# Patient Record
Sex: FEMALE | Race: WHITE | NOT HISPANIC OR LATINO | URBAN - METROPOLITAN AREA
[De-identification: names, ages, dates, MRNs, and addresses within clinical notes are randomized per-mention and may not be internally consistent; named-entity substitution may affect disease eponyms.]

---

## 2017-09-21 ENCOUNTER — OFFICE VISIT - HEALTHEAST (OUTPATIENT)
Dept: MIDWIFE SERVICES | Facility: CLINIC | Age: 32
End: 2017-09-21

## 2017-09-21 DIAGNOSIS — L29.2 VULVAR ITCHING: ICD-10-CM

## 2017-09-21 ASSESSMENT — MIFFLIN-ST. JEOR: SCORE: 1614.71

## 2017-12-12 ENCOUNTER — OFFICE VISIT - HEALTHEAST (OUTPATIENT)
Dept: FAMILY MEDICINE | Facility: CLINIC | Age: 32
End: 2017-12-12

## 2017-12-12 DIAGNOSIS — H66.91 ACUTE RIGHT OTITIS MEDIA: ICD-10-CM

## 2018-04-02 ENCOUNTER — OFFICE VISIT - HEALTHEAST (OUTPATIENT)
Dept: FAMILY MEDICINE | Facility: CLINIC | Age: 33
End: 2018-04-02

## 2018-04-02 DIAGNOSIS — R21 RASH: ICD-10-CM

## 2018-06-15 ENCOUNTER — OFFICE VISIT - HEALTHEAST (OUTPATIENT)
Dept: FAMILY MEDICINE | Facility: CLINIC | Age: 33
End: 2018-06-15

## 2018-06-15 DIAGNOSIS — R07.0 THROAT PAIN: ICD-10-CM

## 2018-06-15 LAB — DEPRECATED S PYO AG THROAT QL EIA: NORMAL

## 2018-06-16 LAB — GROUP A STREP BY PCR: NORMAL

## 2019-06-30 ENCOUNTER — OFFICE VISIT - HEALTHEAST (OUTPATIENT)
Dept: FAMILY MEDICINE | Facility: CLINIC | Age: 34
End: 2019-06-30

## 2019-06-30 DIAGNOSIS — Z20.818 EXPOSURE TO STREP THROAT: ICD-10-CM

## 2019-06-30 DIAGNOSIS — J02.9 ACUTE PHARYNGITIS, UNSPECIFIED ETIOLOGY: ICD-10-CM

## 2019-06-30 LAB — DEPRECATED S PYO AG THROAT QL EIA: NORMAL

## 2019-07-01 LAB — GROUP A STREP BY PCR: NORMAL

## 2019-09-12 ENCOUNTER — COMMUNICATION - HEALTHEAST (OUTPATIENT)
Dept: TELEHEALTH | Facility: CLINIC | Age: 34
End: 2019-09-12

## 2019-10-01 ENCOUNTER — COMMUNICATION - HEALTHEAST (OUTPATIENT)
Dept: HEALTH INFORMATION MANAGEMENT | Facility: CLINIC | Age: 34
End: 2019-10-01

## 2020-02-02 ENCOUNTER — OFFICE VISIT - HEALTHEAST (OUTPATIENT)
Dept: FAMILY MEDICINE | Facility: CLINIC | Age: 35
End: 2020-02-02

## 2020-03-10 ENCOUNTER — RECORDS - HEALTHEAST (OUTPATIENT)
Dept: VASCULAR ULTRASOUND | Facility: CLINIC | Age: 35
End: 2020-03-10

## 2020-03-10 ENCOUNTER — OFFICE VISIT - HEALTHEAST (OUTPATIENT)
Dept: VASCULAR SURGERY | Facility: CLINIC | Age: 35
End: 2020-03-10

## 2020-03-10 DIAGNOSIS — I83.893 VARICOSE VEINS OF BILATERAL LOWER EXTREMITIES WITH OTHER COMPLICATIONS: ICD-10-CM

## 2020-03-10 DIAGNOSIS — I83.893 SYMPTOMATIC VARICOSE VEINS OF BOTH LOWER EXTREMITIES: ICD-10-CM

## 2020-11-20 ENCOUNTER — OFFICE VISIT - HEALTHEAST (OUTPATIENT)
Dept: TELEHEALTH | Facility: CLINIC | Age: 35
End: 2020-11-20

## 2021-05-30 NOTE — PROGRESS NOTES
ASSESSMENT/PLAN:   1. Exposure to strep throat  Rapid Strep A Screen-Throat    azithromycin (ZITHROMAX Z-CAITLIN) 250 MG tablet    Group A Strep, RNA Direct Detection, Throat   2. Acute pharyngitis, unspecified etiology  azithromycin (ZITHROMAX Z-CAITLIN) 250 MG tablet     Patient appears well and is tolerating oral intake. No signs of peritonsillar or retropharyngeal abscess on exam. Clear lungs. Strep test negative however given history, exam and recent exposure, prescription for azithromycin sent to pharmacy.  Symptomatic cares advised.    At the end of the encounter, I discussed results, diagnosis, medications. Discussed red flags for immediate return to clinic/ER, as well as indications for follow up if no improvement. Please view below patient instructions for patient education and return precautions as were discussed during visit.  Patient understood and agreed to plan. Patient was stable for discharge.      Patient Instructions:  Patient Instructions   We will treat you with a course of antibiotics. Please complete the full course of antibiotics. Please take with food. Take a probiotic or eat a Greek yogurt daily while you are on the antibiotic. You will be contagious until you have completed 24 hours of the medication.  Please discard your toothbrush and replace with a new toothbrush to avoid reinfection.    Please use Tylenol 650mg every 4 hours or ibuprofen 600mg every 6 hours by mouth for pain/fever.  Do not exceed 4000mg of acetaminophen or 2400mg of ibuprofen from any source in a 24 hour period.  Taking Tylenol and ibuprofen together may be helpful in reducing pain.      May use salt water gargles and hot teas for comfort. Dissolve one teaspoon of salt in one cup of warm water to make a salt water gargle.    Watch for resolution of symptoms in the next few days. If you continue to have fevers, begin to have difficulty swallowing or breathing, notice neck pain or difficulty moving your neck, please return to  clinic or present to the ER immediately.  Otherwise, follow up with your PCP as needed.        SUBJECTIVE:   Angelina Coombs is a 34 y.o. female  who presents today for evaluation of sore throat and runny nose for a few days. No cough, does endorse odynophagia.  No fevers, no body aches or chills.  Both children currently being treated for strep. No meds for symptoms.     Past Medical History:  Patient Active Problem List   Diagnosis     S/P  section       Surgical History:    Reviewed; Non-contributory    Family History:  Family History   Problem Relation Age of Onset     Hypertension Mother      Arthritis Father      Asthma Sister      No Medical Problems Daughter      No Medical Problems Son      Heart disease Maternal Grandmother      Cancer Maternal Grandfather      Dementia Paternal Grandmother      Diabetes Paternal Grandfather      Heart disease Paternal Grandfather      No Medical Problems Sister      No Medical Problems Sister        Reviewed; Non-contributory      Social History:    Social History     Tobacco Use   Smoking Status Never Smoker   Smokeless Tobacco Never Used     Smoking:  Alcohol use:  Other drug use:  Occupation:      Smoke exposure:  :  Living situation:    Current Medications:  Current Outpatient Medications on File Prior to Visit   Medication Sig Dispense Refill     LACTOBACILLUS ACIDOPHILUS (PROBIOTIC ORAL) Take by mouth.       multivitamin therapeutic tablet Take 1 tablet by mouth daily.       No current facility-administered medications on file prior to visit.        Allergies:   Allergies   Allergen Reactions     Penicillin G Hives and Shortness Of Breath     Ciprofloxacin        I personally reviewed patient's past medical, surgical, social, family history and allergies.    ROS:  Comprehensive 12 pt ROS completed, positives noted in HPI, otherwise negative.      OBJECTIVE:   /75   Pulse 75   Temp 98  F (36.7  C) (Oral)   Resp 18   Wt 198 lb 12.8 oz  (90.2 kg)   SpO2 97%   BMI 30.91 kg/m        General Appearance:  Alert, well-appearing female in NAD. Afebrile.    Integument: Warm, dry  HEENT:  Head: Atraumatic, normocephalic. Face nontraumatic.  Eyes: Conjunctiva clear, Lids normal.  Ears:  TMs pearly, translucent bilaterally. No canal erythema or edema. No mastoid tenderness. No pain with palpation over tragus.  Nose: nares patent. No erythema of nasal mucosa. No rhinorrhea.  Oropharynx: No trismus. Moderate posterior pharyngeal erythema. No palatal petechiae. 2+ tonsillar hypertrophy, copious exudate. Uvula midline. Moist mucus membranes.  Neck: Supple, anterior cervical lymphadenopathy. No meningismus.  Respiratory: No distress. Lungs clear to ausculation bilaterally. No crackles, wheezes, rhonchi or stridor.  Cardiovascular: Regular rate and rhythm, no murmur, rub or gallop. No obvious chest wall deformities. Peripheral pulses 2+ bilaterally. No peripheral edema.  GI: Soft, nontender, normal bowel sounds. No masses, organomegaly, rigidity, or guarding.           Radiology:  I personally ordered and viewed this study. I agree with below radiology findings.    No results found.      Laboratory Studies:  I personally ordered and interpreted these studies.    Results for orders placed or performed in visit on 06/30/19   Rapid Strep A Screen-Throat   Result Value Ref Range    Rapid Strep A Antigen No Group A Strep detected, presumptive negative No Group A Strep detected, presumptive negative         Nicolette Pascual, CNP

## 2021-05-30 NOTE — PATIENT INSTRUCTIONS - HE
We will treat you with a course of antibiotics. Please complete the full course of antibiotics. Please take with food. Take a probiotic or eat a Greek yogurt daily while you are on the antibiotic. You will be contagious until you have completed 24 hours of the medication.  Please discard your toothbrush and replace with a new toothbrush to avoid reinfection.    Please use Tylenol 650mg every 4 hours or ibuprofen 600mg every 6 hours by mouth for pain/fever.  Do not exceed 4000mg of acetaminophen or 2400mg of ibuprofen from any source in a 24 hour period.  Taking Tylenol and ibuprofen together may be helpful in reducing pain.      May use salt water gargles and hot teas for comfort. Dissolve one teaspoon of salt in one cup of warm water to make a salt water gargle.    Watch for resolution of symptoms in the next few days. If you continue to have fevers, begin to have difficulty swallowing or breathing, notice neck pain or difficulty moving your neck, please return to clinic or present to the ER immediately.  Otherwise, follow up with your PCP as needed.

## 2021-05-31 VITALS — BODY MASS INDEX: 31.08 KG/M2 | WEIGHT: 198 LBS | HEIGHT: 67 IN

## 2021-05-31 VITALS — WEIGHT: 198 LBS | BODY MASS INDEX: 30.78 KG/M2

## 2021-06-01 VITALS — BODY MASS INDEX: 31.48 KG/M2 | WEIGHT: 202.5 LBS

## 2021-06-01 VITALS — WEIGHT: 202 LBS | BODY MASS INDEX: 31.4 KG/M2

## 2021-06-03 VITALS — BODY MASS INDEX: 30.91 KG/M2 | WEIGHT: 198.8 LBS

## 2021-06-04 VITALS
SYSTOLIC BLOOD PRESSURE: 92 MMHG | WEIGHT: 200.7 LBS | BODY MASS INDEX: 31.2 KG/M2 | RESPIRATION RATE: 16 BRPM | DIASTOLIC BLOOD PRESSURE: 64 MMHG | HEART RATE: 64 BPM | TEMPERATURE: 98.1 F

## 2021-06-06 NOTE — PROGRESS NOTES
Consult, self referring.  Bilateral symptomatic varicose veins, left worse than right.  Pain 3/10 BLE.  Wearing knee high compressions.  US done and reviewed.  VISENZE.
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
## 2021-06-14 NOTE — PROGRESS NOTES
Chief Complaint   Patient presents with     poss ear infection     Both ears, but mainly the right side . admit of a sore throat, low grade fever, and congested, and cough, 4x day.        HPI:    Patient is here for 4 days of cough, runny nose, sore throat, with bilateral ear pain, associated with low grade fever. No chest pain, shortness of breath.    ROS: Pertinent ROS noted in HPI.     Allergies   Allergen Reactions     Penicillin G Hives and Shortness Of Breath       Patient Active Problem List   Diagnosis     S/P  section       Family History   Problem Relation Age of Onset     Hypertension Mother      Arthritis Father      Asthma Sister      No Medical Problems Daughter      No Medical Problems Son      Heart disease Maternal Grandmother      Cancer Maternal Grandfather      Dementia Paternal Grandmother      Diabetes Paternal Grandfather      Heart disease Paternal Grandfather      No Medical Problems Sister      No Medical Problems Sister        Social History     Social History     Marital status:      Spouse name: N/A     Number of children: N/A     Years of education: N/A     Occupational History     Not on file.     Social History Main Topics     Smoking status: Never Smoker     Smokeless tobacco: Never Used     Alcohol use Yes      Comment: socially     Drug use: No     Sexual activity: Yes     Partners: Male     Birth control/ protection: Condom     Other Topics Concern     Not on file     Social History Narrative         Objective:    Vitals:    17 1409   BP: 124/70   Pulse: 92   Resp: 18   Temp: 99.1  F (37.3  C)   SpO2: 97%       Gen:NAD  Oropharynx: normal throat, oral mucosa  Ears: R TM erythematous and bulging. L TM normal. Ear canals normal  Nose: clear rhinorrhea  Neck:NAD  CV:RRR, no M, R, G  Pulm: CTAB, normal effort        Acute right otitis media  -     azithromycin (ZITHROMAX Z-CAITLIN) 250 MG tablet; Take 2 tablets (500 mg) on  Day 1,  followed by 1 tablet (250 mg) once  daily on Days 2 through 5.      Supportive cares as directed.

## 2021-06-17 NOTE — PROGRESS NOTES
Subjective:   Angelina Coombs is a 33 y.o. female  Roomed by: carrie      Chief Complaint   Patient presents with     Rash     all over abdomin, started 3/29, white spots, little itchy   Says she was in front the mirror on 3/29 and first noticed some tiny red bumps on the front of her lower abdomen.  Patient says that she has one bump on both sides of her abdomen.  Says that the spots are not very itchy at all. Has been staying with a friend for 3 weeks.  Denies that any other household members have had this rash.  Says the only new thing she has is using some free and clear clothes soap.She is an  and wanted to know if she her rash looks like something contagious like shingles or chickenpox.   Review of Systems  Skin - see HPI  Allergies   Allergen Reactions     Penicillin G Hives and Shortness Of Breath       Current Outpatient Prescriptions:      LACTOBACILLUS ACIDOPHILUS (PROBIOTIC ORAL), Take by mouth., Disp: , Rfl:      multivitamin therapeutic tablet, Take 1 tablet by mouth daily., Disp: , Rfl:   Patient Active Problem List   Diagnosis     S/P  section     Medical History Reviewed  Objective:     Vitals:    18 1422   BP: 118/62   Patient Site: Left Arm   Patient Position: Sitting   Cuff Size: Adult Regular   Pulse: 81   Resp: 16   Temp: 98  F (36.7  C)   TempSrc: Oral   SpO2: 98%   Weight: 202 lb 8 oz (91.9 kg)   General-patient is in no apparent distress  Skin-abdomen-just below the umbilicus are about 5 pinpoint erythematous papules; on both sides of her abdomen is a single 1 mm in diameter white papule with erythematous base.  No excoriations or induration noted.      Assessment - Plan   Medical Decision Making -33-year-old woman presents with a four-day history of about 7 single papules on the front and sides of her abdomen.  None of them are very pruritic.  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  Discussed the patient that her rash was not can  consistent with chickenpox, shingles, bedbugs, eczema or insect bites.  Advised patient to follow-up with 1 of the primary care providers or to be seen by dermatology.     1. Rash  - Nursing communication    At the conclusion of the encounter, assessment and plan were discussed.   All questions were answered.   The patient or guardian acknowledged understanding and was involved in the decision making regarding the overall care plan.    Patient Instructions   1. Follow up with either one of the primary care provider or you can try   [facility]   [address] [place] [address] [id number], [place] [address]  2. If you have any questions, call the clinic number - it's answered 24/7

## 2021-06-18 NOTE — PROGRESS NOTES
Subjective:      Patient ID: Angelina Coombs is a 33 y.o. female.    Chief Complaint:    HPI Angelina Coombs is a 33 y.o. female who presents today complaining of fever ×1 day.  Patient has a known exposure to strep throat from her daughter. She has been taking Ibuprofen at home her last dose was greater than 12 hours ago.  She reports that she is also having a scratchy throat and some mild ear pain.  She denies any runny nose, wheezing, abdominal complaints, or rash.  When she was having a fever she also reported headache that has since resolved.  She is mainly here to rule out strep throat.      Social History   Substance Use Topics     Smoking status: Never Smoker     Smokeless tobacco: Never Used     Alcohol use Yes      Comment: socially       Review of Systems   Constitutional: Positive for chills, fatigue and fever.   HENT: Positive for ear pain and sore throat. Negative for rhinorrhea.    Respiratory: Positive for cough (mild). Negative for wheezing.    Gastrointestinal: Negative for abdominal pain, diarrhea, nausea and vomiting.   Skin: Negative for rash.   Neurological: Positive for headaches.       Objective:     /60  Pulse 90  Temp 97.7  F (36.5  C) (Oral)   Resp 14  Wt 202 lb (91.6 kg)  LMP 05/25/2018 (Exact Date)  SpO2 97%  Breastfeeding? No  BMI 31.4 kg/m2    Physical Exam   Constitutional: Vital signs are normal. She appears well-developed and well-nourished. No distress.   HENT:   Head: Normocephalic and atraumatic.   Right Ear: Tympanic membrane, external ear and ear canal normal.   Left Ear: Tympanic membrane, external ear and ear canal normal.   Mouth/Throat: Uvula is midline. Posterior oropharyngeal erythema present. No oropharyngeal exudate, posterior oropharyngeal edema or tonsillar abscesses.   Eyes: Conjunctivae are normal.   Cardiovascular: Normal rate, regular rhythm and normal heart sounds.  Exam reveals no gallop and no friction rub.    No murmur  heard.  Pulmonary/Chest: Effort normal and breath sounds normal. No respiratory distress. She has no wheezes. She has no rales.   Lymphadenopathy:     She has cervical adenopathy (mild).   Skin: She is not diaphoretic.   Psychiatric: She has a normal mood and affect. Her behavior is normal. Judgment and thought content normal.   Nursing note and vitals reviewed.      Labs:  Recent Results (from the past 24 hour(s))   Rapid Strep A Screen-Throat   Result Value Ref Range    Rapid Strep A Antigen No Group A Strep detected, presumptive negative No Group A Strep detected, presumptive negative     Clinical Decision Making:  Patient's physical exam is relatively benign today.  Her fever has resolved since yesterday.  RST was negative today.  Confirmatory strep test pending.  Recommend supportive cares and follow-up if symptoms persist.    Assessment:     Procedures    1. Throat pain  Rapid Strep A Screen-Throat    Group A Strep, RNA Direct Detection, Throat         Patient Instructions   1) Increase fluids and rest  2) Continue taking Tylenol/Ibuprofen for fever/pain relief as needed.  3) Salt water gargles and lozenges can be helpful for throat relief  4) You will only be notified of the confirmatory strep results if they are positive.   5) Follow up if symptoms persist for longer than 5 days.

## 2021-06-18 NOTE — PATIENT INSTRUCTIONS - HE
"Patient Instructions by Beto Crawford RN at 3/10/2020  8:20 AM     Author: Beto Crawford RN Service: -- Author Type: Registered Nurse    Filed: 3/16/2020  9:24 PM Encounter Date: 3/10/2020 Status: Addendum    : Sarwat Healy MD (Physician)    Related Notes: Original Note by Beto Crawford RN (Registered Nurse) filed at 3/10/2020 10:33 AM       We are prescribing some compression stockings for you. I have included different suppliers that should help you get measured and fitting to ensure proper fitting socks. You should wear this socks as much as you can. It is especially important to wear them with long periods of sitting/standing, long car rides or if you will be flying. Compression socks should get refilled every 4-6 months. They do not need to be worn at night while in bed.    If you do a lot of standing it is good to do calf raises to help keep the blood pumping. If you sit a lot at work it is good to get up periodically to walk around. Elevation of the foot of your bed 4-6\" helps the blood return back to where it is needed.        Varicose Veins      Varicose veins are swollen, enlarged veins most often found in the legs. They are usually blue or purple in color and may bulge, twist, and stand out under the skin.  Normally, veins return blood from the body to the heart. The leg veins have one-way valves that prevent blood from flowing backward in the vein. When the valves are weak or damaged, blood backs up in the veins. This may cause some of the veins to swell and bulge and become varicose veins.  Symptoms  Varicose veins may or may not cause symptoms. If symptoms do occur, they can include:    Legs that feel tired, achy, heavy, or itchy    Leg muscle cramps    Skin changes, such as discoloration, dryness, redness, or rash (in more severe cases, you may also have sores on the skin called venous leg ulcers)  Risk Factors  There are a number of factors that increase the risk for varicose veins. " These can include:    Being a woman    Being older    Sitting or standing for long periods    Being overweight    Being pregnant    Having a family history of varicose veins  Treatment begins with simple self-help measures (see below). If these dont help, there are many procedures that can be done to shrink or remove varicose veins. Your healthcare provider can tell you more about these options, if needed.  Home care    Support or compression stockings will likely be prescribed. If so, be sure to wear them as directed. They may help improve blood flow.    Exercising helps strengthen your leg muscles and improve blood flow. To get the most benefit, choose exercises such as walking, swimming, or cycling. Also try to exercise for at least 30 minutes on most days.    Raising (elevating) your legs lets gravity help blood flow back to the heart. Sit or lie with your feet above heart level a few times throughout the day, or as directed.    Avoid long periods of sitting or standing. Change positions often. Also, move your ankles, toes and knees often. This may also help improve blood flow.    If you are overweight, talk with your healthcare provider about setting up a weight-loss plan. Maintaining a healthy weight can help reduce the strain on your veins. It may also improve symptoms, such as swelling and aching.    If you have dryness and itching, ask your provider about special lotions that can be applied to the skin to help improve symptoms.  Follow-up care  Follow up with your healthcare provider, or as directed. If imaging tests were done, youll be told the results and if there are any new findings that affect your care.  When to seek medical advice  Call your healthcare provider right away if any of these occur:    Sudden, severe leg swelling, pain, or redness    Symptoms worsen, or they dont improve with self-care    Bleeding from any affected veins    Ulcers form on the legs, ankles, or feet    Fever of 100.4 F  (38 C) or higher, or as advised by your provider      Understanding Spider and Varicose Veins  Do you often hide your legs because of the way they look? You may have noticed tiny red or blue bursts (spider veins). Or maybe you have veins that bulge or look twisted (varicose veins). If so, there are treatments that can help  What are the symptoms?  Spider veins or varicose veins may never be a problem. But sometimes they can cause legs to ache or swell. Your legs may also feel heavy and tired, or like theyre burning. These symptoms may be more severe at the end of the day. Prolonged sitting or standing can also make your symptoms worse.  Who gets spider and varicose veins?  Anyone can get spider or varicose veins. But vein problems tend to be hereditary (run in families). Other factors that can affect veins include:    Pregnancy, hormones, and birth control pills    A job where you stand or sit a lot    Extra weight or lack of exercise    Age         Spider veins look like tiny webs on the ankles, legs, and upper thighs.       Ropy, dark blue, red, or flesh-colored varicose veins are most common on the thighs, calves, and feet.    What can be done?  Spider and varicose veins can affect the way you feel about yourself. Talk to your healthcare provider about your concerns. There are treatments that can ease symptoms and make your legs look better.  Your treatment choices  Treatment may include self-care, sclerotherapy (injecting veins with a chemical), surgery, or newer nonsurgical minimally invasive therapies. Spider veins and some varicose veins can be treated with sclerotherapy. Large varicose veins can often be treated with newer minimally invasive procedures and, in rare cases, surgery may be needed.     Please call [facility] to schedule an appointment. If you received a prescription please bring it with you to your appointment. You may call one of the locations below, although some  locations are limited to what they carry.    Office Locations  New Locations  [facility]  Home Medical Equipment  [address], [place] [address]  Orthotics and Prosthetics ([facility])  [address], [place] [address]  Phone [phone] /Fax [phone]        [facility]   [address]   Medical Equipment [address]/Orthotics and Prosthetics [address]  [place] [address]   Phone: [phone]  Fax [phone]    [facility]  [address]  [place] [address]  Phone: [phone]  Fax: [phone]    [facility]   [address]  [place] [address]  Phone: [phone]  Fax: [phone]    [facility]  [address]  [place] [address]  Phone: [phone]  Fax: [phone]    [facility]  [address]  [place] [address]  Phone: [phone]  Fax: [phone]    [facility]  [address]   [place] [address]   Phone: [phone]  Fax: [phone]    [place]   [address] [place] [address]   Phone: [phone]  Fax: [phone]    [facility]  [address]  [place] [address]    [facility] [phone]  [phone]  [place] [phone]  [email or web address]      [facility]   [address]             [address]                 [address]     [place] [address]      [place] [address]         [place], [place] [id number] [phone]  [phone]     [phone]               Fax: [phone]  [email or web address]                                                      [facility]  [address] [place] [address]  [phone]  [phone]  [email or web address]    [facility]  [phone]  [email or web address] [facility]   [phone]    [facility]  [address] [place] [address]    [phone]    After your phlebectomy we would like to see you two weeks after follow up. If you don't already have a follow up appointment we should be seen at the Vascular Center in 2 weeks. Please ensure you are wrapping your leg with an ace bandage or short stretch wrap for the next 5-7 days until it is comfortable enough to transition into your compression stocking. Please call the Vascular Center with any questions at [phone]    Surgery for Varicose Veins  If you have large varicose veins, surgery may be the best choice. However, it will not prevent new varicose veins from forming. Surgery is most often performed in a hospital or surgery center on an outpatient basis.  Varicose vein surgery    Your surgery will be tailored to your needs. Varicose veins may be tied off (ligation), destroyed, or removed. Blood will then flow through the healthy veins. One or more of the following techniques may be used:  Vein stripping and ligation  In more severe cases, the surgeon may tie off and remove veins by making smaller cuts in the skin. Smaller branching veins may also be tied off or removed.  Microphlebectomy or ambulatory phlebectomy  A special hook is used to gently take out a varicose vein through tiny incisions. Microphlebectomy may be done in your healthcare providers office.  Sclerotherapy  Your healthcare provider will inject the varicose vein with a special chemical that will quickly close the vein from the inside. This is particularly useful for smaller veins.  PIN stripping  All or part of the vein may be removed with a stripping instrument.  Ablation (laser or radiofrequency)  A tiny cut in the skin is  made near the varicose vein. A small tube called a catheter is inserted into the vein. Energy or heat released from the catheter tip will make the vein walls collapse and stick together, stopping all blood flow through the vein.   Know about the risks  Your healthcare provider will talk with you about the risks of surgery. These include:    Bleeding or swelling    A sense of numbness, burning, or tingling in areas near the procedure    Edema or swelling in the legs    Clots in the deep veins that may travel to the lungs    Infection    Scarring   Date Last Reviewed: 5/1/2016 2000-2016 The [employer]. [address] [place] [address]. All rights reserved. This information is not intended as a substitute for professional medical care. Always follow your healthcare professional's instructions.      Procedure: phlebectomy/code 93748        The surgery scheduler Bella Trotter at [phone] will contact you to schedule if you were not scheduled today.     You will need a preop physical within 30 days before surgery with your primary care provider. Please note if you do not get a complete History and Physical your surgery will be cancelled.   Please contact your primary to schedule.     A nurse should contact you from the hospital 1-2 days before surgery to review with you.    If you would like a Good [place] Estimate for your upcoming service/procedure contact Cost of Care Estimates at [phone], advocates are available Monday through Friday 8am - 5pm. You may also submit a request online at [email or web address]    [facility]  [address]  [address]     [phone] for facility charge    Anesthesiology charge  [phone]  `        Please don't hesitate to call us with any additional question or problems  Our number is [phone]

## 2021-06-19 NOTE — LETTER
Letter by Evelyn Dejesus at      Author: Evelyn Dejesus Service: -- Author Type: --    Filed:  Encounter Date: 10/1/2019 Status: Signed         October 1, 2019       Angelina Coombs  [address] [place] [address]    Dear Angelina Coombs:    We are pleased to provide you with secure, online access to medical information for you and your family within [facility]. Per your request, we have expanded your account to allow access to the records of the following family members:                          Timmy Coombs (privilege ends on 10/5/2028.)     How Do I Log In?  1. In your Internet browser, go to [email or web address]  2. Log into [facility] using your [facility] Username and Password.  3. Click Sign In.        How Do I Access a Family Member's Account?  4. Select the account you want to access by clicking the Augustine with the appropriate patient's name at the top of your screen.   5. You will see a disclaimer page letting you know that you will be viewing a family member's record. Review the disclaimer and then click Accept Proxy Access Disclaimer to proceed.  6. Once you switch to viewing a family member's record, you can navigate to [facility] pages the same way you would for yourself. You can return to your own account by clicking the Augustine at the top of the screen with your name on it.    7. To customize the colors and names of the linked accounts, you can select Personalize from the Profile dropdown menu at the top of the screen, then click the Edit button to make changes.     Additional Information  If you have questions, visit [email or web address], e-mail [email or web address] or call [phone] to talk to our [facility] staff. Remember, [facility] is NOT to be used for urgent needs. For medical emergencies, dial 911.
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
## 2021-08-15 ENCOUNTER — HEALTH MAINTENANCE LETTER (OUTPATIENT)
Age: 36
End: 2021-08-15

## 2021-10-11 ENCOUNTER — HEALTH MAINTENANCE LETTER (OUTPATIENT)
Age: 36
End: 2021-10-11

## 2022-09-25 ENCOUNTER — HEALTH MAINTENANCE LETTER (OUTPATIENT)
Age: 37
End: 2022-09-25

## 2023-10-14 ENCOUNTER — HEALTH MAINTENANCE LETTER (OUTPATIENT)
Age: 38
End: 2023-10-14

## 2024-03-02 ENCOUNTER — HEALTH MAINTENANCE LETTER (OUTPATIENT)
Age: 39
End: 2024-03-02